# Patient Record
Sex: MALE | Race: WHITE | URBAN - METROPOLITAN AREA
[De-identification: names, ages, dates, MRNs, and addresses within clinical notes are randomized per-mention and may not be internally consistent; named-entity substitution may affect disease eponyms.]

---

## 2023-09-16 ENCOUNTER — TELEPHONE (OUTPATIENT)
Facility: HOSPITAL | Age: 78
End: 2023-09-16

## 2023-09-16 RX ORDER — SULFAMETHOXAZOLE AND TRIMETHOPRIM 800; 160 MG/1; MG/1
1 TABLET ORAL 2 TIMES DAILY
Qty: 14 TABLET | Refills: 0 | Status: SHIPPED | OUTPATIENT
Start: 2023-09-16 | End: 2023-09-23

## 2023-09-16 NOTE — TELEPHONE ENCOUNTER
Pt called with UTI sxs after recently completing course of macrobid. Reviewed culture and sensitivity panel. He self caths. Symptoms include pelvic pain and worsening frequency between caths. No fever. Sent bactrim script to pharmacy. 7 days. Told him to come in for another culture if symptoms worsen despite bactrim.     Author MD Adriano  [facility]